# Patient Record
Sex: MALE | Race: WHITE | URBAN - METROPOLITAN AREA
[De-identification: names, ages, dates, MRNs, and addresses within clinical notes are randomized per-mention and may not be internally consistent; named-entity substitution may affect disease eponyms.]

---

## 2023-06-19 NOTE — PROGRESS NOTES
[place]      Patient Name: [address] [facility] Record Number:  [id number]  Primary Care Physician:  Stefanie Gibbons MD  Date of Consultation: 6/20/2023    Chief Complaint:   Chief Complaint   Patient presents with    Hearing Problem     Patient is here today for decreased hearing. Patient states about a month and a half ago his allergies were really bad and woke up next morning could not hear from left ear. Patient has been given antibiotic and steroid. Patient did not take all the antibiotic but used all the steroid. Patient states he had drainage from the left year at this time as well, and off balance. Patient states the drainage has stopped but off balance has not stopped. Patient states he has been having an off balance for about       [address]  Bhargav Diaz is a(n) 62 y.o. male who presents for evaluation of decreased hearing. He states that for about 4 to 6 weeks he has had decreased hearing on the left side. He has been on multiple rounds of antibiotics and steroids without improvement in his hearing. Prior to this he did not have any difficulty with his hearing. He does occasionally become off balance. Patient Active Problem List   Diagnosis    Pain in joint, lower leg    Tear of medial cartilage or meniscus of knee, current     Past Surgical History:   Procedure Laterality Date    KNEE SURGERY      LUNG SURGERY      SHOULDER SURGERY       No family history on file.   Social History     Socioeconomic History    Marital status:      Spouse name: Not on file    Number of children: Not on file    Years of education: Not on file    Highest education level: Not on file   Occupational History    Not on file   Tobacco Use    Smoking status: Former    Smokeless tobacco: Not on file   Vaping Use    Vaping Use: Never used   Substance and Sexual Activity    Alcohol use: No    Drug use: No    Sexual activity: Yes

## 2023-06-20 ENCOUNTER — PROCEDURE VISIT (OUTPATIENT)
Dept: AUDIOLOGY | Age: 58
End: 2023-06-20
Payer: COMMERCIAL

## 2023-06-20 ENCOUNTER — OFFICE VISIT (OUTPATIENT)
Dept: ENT CLINIC | Age: 58
End: 2023-06-20
Payer: COMMERCIAL

## 2023-06-20 VITALS
OXYGEN SATURATION: 97 % | SYSTOLIC BLOOD PRESSURE: 133 MMHG | HEART RATE: 72 BPM | BODY MASS INDEX: 27.22 KG/M2 | WEIGHT: 201 LBS | DIASTOLIC BLOOD PRESSURE: 84 MMHG | HEIGHT: 72 IN | TEMPERATURE: 98 F

## 2023-06-20 DIAGNOSIS — H90.A21 SENSORINEURAL HEARING LOSS, UNILATERAL, RIGHT EAR, WITH RESTRICTED HEARING ON THE CONTRALATERAL SIDE: Primary | ICD-10-CM

## 2023-06-20 DIAGNOSIS — H90.A32 MIXED CONDUCTIVE AND SENSORINEURAL HEARING LOSS OF LEFT EAR WITH RESTRICTED HEARING OF RIGHT EAR: Primary | ICD-10-CM

## 2023-06-20 DIAGNOSIS — H90.A32 MIXED CONDUCTIVE AND SENSORINEURAL HEARING LOSS, UNILATERAL, LEFT EAR WITH RESTRICTED HEARING ON THE CONTRALATERAL SIDE: ICD-10-CM

## 2023-06-20 DIAGNOSIS — H93.13 TINNITUS OF BOTH EARS: ICD-10-CM

## 2023-06-20 DIAGNOSIS — H65.92 LEFT OTITIS MEDIA WITH EFFUSION: ICD-10-CM

## 2023-06-20 PROCEDURE — 92567 TYMPANOMETRY: CPT | Performed by: AUDIOLOGIST

## 2023-06-20 PROCEDURE — 69420 INCISION OF EARDRUM: CPT | Performed by: STUDENT IN AN ORGANIZED HEALTH CARE EDUCATION/TRAINING PROGRAM

## 2023-06-20 PROCEDURE — 92557 COMPREHENSIVE HEARING TEST: CPT | Performed by: AUDIOLOGIST

## 2023-06-20 PROCEDURE — G8427 DOCREV CUR MEDS BY ELIG CLIN: HCPCS | Performed by: STUDENT IN AN ORGANIZED HEALTH CARE EDUCATION/TRAINING PROGRAM

## 2023-06-20 PROCEDURE — 3017F COLORECTAL CA SCREEN DOC REV: CPT | Performed by: STUDENT IN AN ORGANIZED HEALTH CARE EDUCATION/TRAINING PROGRAM

## 2023-06-20 PROCEDURE — G8419 CALC BMI OUT NRM PARAM NOF/U: HCPCS | Performed by: STUDENT IN AN ORGANIZED HEALTH CARE EDUCATION/TRAINING PROGRAM

## 2023-06-20 PROCEDURE — 1036F TOBACCO NON-USER: CPT | Performed by: STUDENT IN AN ORGANIZED HEALTH CARE EDUCATION/TRAINING PROGRAM

## 2023-06-20 PROCEDURE — 99204 OFFICE O/P NEW MOD 45 MIN: CPT | Performed by: STUDENT IN AN ORGANIZED HEALTH CARE EDUCATION/TRAINING PROGRAM

## 2023-06-20 ASSESSMENT — ENCOUNTER SYMPTOMS
VOMITING: 0
NAUSEA: 0
COUGH: 0
EYE PAIN: 0
SHORTNESS OF BREATH: 0
RHINORRHEA: 0

## 2023-06-20 NOTE — PROGRESS NOTES
Nic Huber   1965, 62 y.o. male   [id number]       Referring Provider: Amador Duval DO  Referral Type: In an order in [address] [place] [address]    Reason for Visit: Evaluation of the cause of disorders of hearing, tinnitus, or balance. ADULT AUDIOLOGIC EVALUATION      Nic Huber is a 62 y.o. male seen today, 6/20/2023 , for an initial audiologic evaluation. Patient was seen by Amador Duval DO following today's evaluation. AUDIOLOGIC AND OTHER PERTINENT MEDICAL HISTORY:      Nic Huber reports decreased hearing sensitivity in his left ear with onset approximately 6 weeks ago. He states he is able to hear himself speak and chew louder than normal. He was treated with antibiotics without resolve in hearing sensitivity. He reports bilateral tinnitus that is mostly non-intrusive to daily activities such as task concentration, relaxation, and/or sleeping. Patient has a history of occupational and/or recreational noise exposure. No other significant otologic history reported. He denied otorrhea, dizziness, imbalance, history of falls, history of head trauma, and history of ear surgery. Date: 6/20/2023     IMPRESSIONS:      Today's results revealed a mixed hearing loss in the left ear and sensorineural hearing loss in the right. Excellent speech understanding when in quiet. Tympanometry indicates low movement of ear drum/tympanic membrane, consistent with middle ear abnormality. Discussed test results and implications with patient. Follow medical recommendations of Gregory Phillips DO.     ASSESSMENT AND FINDINGS:     Otoscopy unremarkable. RIGHT EAR:  Hearing Sensitivity: Hearing within normal limits sloping to a moderate sensorineural hearing loss. Speech Recognition Threshold: 15 dB HL  Word Recognition: Excellent 100%, based on NU-6 25-word list at 60 dBHL using recorded speech stimuli. Tympanometry: Normal peak pressure and compliance, Type A tympanogram, consistent with normal middle ear function.  Volume

## 2024-01-05 ENCOUNTER — HOSPITAL ENCOUNTER (EMERGENCY)
Age: 59
Discharge: HOME | End: 2024-01-05

## 2024-01-05 VITALS
WEIGHT: 205 LBS | DIASTOLIC BLOOD PRESSURE: 82 MMHG | HEART RATE: 88 BPM | OXYGEN SATURATION: 93 % | SYSTOLIC BLOOD PRESSURE: 131 MMHG | BODY MASS INDEX: 27.8 KG/M2 | RESPIRATION RATE: 16 BRPM | TEMPERATURE: 99.3 F

## 2024-01-05 DIAGNOSIS — S01.01XA LACERATION OF SCALP, INITIAL ENCOUNTER: Primary | ICD-10-CM

## 2024-01-05 PROCEDURE — 12001 RPR S/N/AX/GEN/TRNK 2.5CM/<: CPT

## 2024-01-05 PROCEDURE — 99282 EMERGENCY DEPT VISIT SF MDM: CPT

## 2024-01-05 ASSESSMENT — PAIN SCALES - GENERAL: PAINLEVEL_OUTOF10: 8

## 2024-01-05 ASSESSMENT — ENCOUNTER SYMPTOMS
SORE THROAT: 0
SHORTNESS OF BREATH: 0
ABDOMINAL PAIN: 0
FACIAL SWELLING: 0
RHINORRHEA: 0
COLOR CHANGE: 0

## 2024-01-05 ASSESSMENT — PAIN - FUNCTIONAL ASSESSMENT: PAIN_FUNCTIONAL_ASSESSMENT: 0-10

## 2024-01-05 ASSESSMENT — PAIN DESCRIPTION - DESCRIPTORS: DESCRIPTORS: ACHING

## 2024-01-05 ASSESSMENT — PAIN DESCRIPTION - LOCATION: LOCATION: HEAD

## 2024-01-06 NOTE — ED PROVIDER NOTES
[facility] ED  EMERGENCY DEPARTMENT ENCOUNTER      I am the Primary Clinician of Record    Note started: 8:22 PM EST 1/5/24    DAVE. I have evaluated this patient.          Pt Name: Robbie Taveras  MRN: [id number]  Birthdate 1965  Dateof evaluation: 1/5/2024  Provider: Afsaneh Benoit, APRN - CNP  PCP: Leopoldo Cassidy MD  ED Attending: No att. providers found      CHIEF COMPLAINT       Chief Complaint   Patient presents with    Head Laceration     Pt attempted to duck under metal framing and sliced his head. Bleeding controlled in triage.        HISTORY OF PRESENTILLNESS   (Location/Symptom, Timing/Onset, Context/Setting, Quality, Duration, Modifying Factors, Severity)  Note limiting factors.     Robbie Taveras is a 58 y.o. male for scalp laceration. Onset was today.  Context includes patient reports that he ducked to miss a piece of metal and states he accidentally sustained laceration on top of his head.  He reports his immunizations are up-to-date.  Denies others and he did not have loss of consciousness.. Alleviating factors include nothing.  Aggravating factors include nothing. Pain is 8/10.  Nothing has been used for pain today.     Nursing Notes were all reviewed and agreed with or any disagreements were addressed  in the HPI.      REVIEW OF SYSTEMS       Review of Systems   Constitutional:  Negative for activity change, appetite change and fever.   HENT:  Negative for congestion, facial swelling, rhinorrhea and sore throat.    Eyes:  Negative for visual disturbance.   Respiratory:  Negative for shortness of breath.    Cardiovascular:  Negative for chest pain.   Gastrointestinal:  Negative for abdominal pain.   Genitourinary:  Negative for difficulty urinating.   Musculoskeletal:  Negative for arthralgias and myalgias.   Skin:  Positive for wound. Negative for color change and rash.   Neurological:  Negative for dizziness and light-headedness.   Psychiatric/Behavioral:   Negative for agitation.    All other systems reviewed and are negative.      Positives and Pertinent negatives as per HPI.  Except as noted above in the ROS, all other systems were reviewed and negative.       PAST MEDICAL HISTORY     Past Medical History:   Diagnosis Date    Seizures (HCC)          SURGICAL HISTORY       Past Surgical History:   Procedure Laterality Date    KNEE SURGERY      LUNG SURGERY      SHOULDER SURGERY           CURRENT MEDICATIONS       Discharge Medication List as of 1/5/2024  7:32 PM        CONTINUE these medications which have NOT CHANGED    Details   meloxicam (MOBIC) 15 MG tablet Take 1 tablet by mouth daily., Disp-30 tablet, R-3      naproxen (NAPROSYN) 500 MG tablet Take 1 tablet by mouth 2 times daily as needed for Pain for 30 days. Take with food, Disp-20 tablet, R-0      hydrocortisone (ANUSOL-HC) 2.5 % rectal cream Place rectally 2 times daily., Disp-30 g, R-1, Print      phenytoin (DILANTIN) 100 MG ER capsule Take 2 capsules by mouth 2 times dailyHistorical Med      phenobarbital (LUMINAL) 30 MG tablet Take 2 tablets by mouth daily. AT NIGHTHistorical Med      carbamazepine (TEGRETOL XR) 400 MG SR tablet Take 1 tablet by mouth 2 times dailyHistorical Med               ALLERGIES     Patient has no known allergies.      FAMILY HISTORY     No family history on file.       SOCIAL HISTORY       Social History     Socioeconomic History    Marital status:    Tobacco Use    Smoking status: Former   Vaping Use    Vaping Use: Never used   Substance and Sexual Activity    Alcohol use: No    Drug use: No    Sexual activity: Yes     Partners: Female         SCREENINGS           CIWA Assessment  BP: 131/82  Pulse: 88          PHYSICAL EXAM     ED Triage Vitals [01/05/24 1854]   BP Temp Temp Source Pulse Respirations SpO2 Height Weight - Scale   (!) 164/78 99.3 °F (37.4 °C) Oral 99 18 95 % -- 93 kg (205 lb)       Physical Exam  Constitutional:       Appearance: Normal appearance. He is